# Patient Record
Sex: FEMALE | Race: WHITE | NOT HISPANIC OR LATINO | URBAN - METROPOLITAN AREA
[De-identification: names, ages, dates, MRNs, and addresses within clinical notes are randomized per-mention and may not be internally consistent; named-entity substitution may affect disease eponyms.]

---

## 2017-01-12 ENCOUNTER — ALLSCRIPTS OFFICE VISIT (OUTPATIENT)
Dept: OTHER | Facility: OTHER | Age: 27
End: 2017-01-12

## 2017-01-17 ENCOUNTER — LAB CONVERSION - ENCOUNTER (OUTPATIENT)
Dept: OTHER | Facility: OTHER | Age: 27
End: 2017-01-17

## 2017-01-17 LAB
ADDITIONAL INFORMATION (HISTORICAL): NORMAL
ADEQUACY: (HISTORICAL): NORMAL
COMMENT (HISTORICAL): NORMAL
CYTOTECHNOLOGIST: (HISTORICAL): NORMAL
INTERPRETATION (HISTORICAL): NORMAL
LMP (HISTORICAL): NORMAL
PREV. BX: (HISTORICAL): NORMAL
PREV. PAP (HISTORICAL): NORMAL
REVIEWED BY (HISTORICAL): NORMAL
SOURCE (HISTORICAL): NORMAL

## 2018-01-09 NOTE — RESULT NOTES
Verified Results  (1) URINALYSIS w URINE C/S REFLEX (will reflex a microscopy if leukocytes, occult blood, or nitrites are not within normal limits) [id number] 08:38PM Mariangel Ceron     Test Name Result Flag Reference   COLOR Yellow     CLARITY Cloudy     PH UA 5 5  4 5-8 0   LEUKOCYTE ESTERASE UA Large A Negative   NITRITE UA Negative  Negative   PROTEIN UA Trace mg/dl A Negative   GLUCOSE UA Negative mg/dl  Negative   KETONES UA Negative mg/dl  Negative   UROBILINOGEN UA 0 2 E U /dl  0 2, 1 0 E U /dl   BILIRUBIN UA Negative  Negative   BLOOD UA Moderate A Negative   SPECIFIC GRAVITY UA 1 016  1 003-1 030     (1) URINALYSIS w URINE C/S REFLEX (will reflex a microscopy if leukocytes, occult blood, or nitrites are not within normal limits) [id number] 08:38PM Mariangel Ceron     Test Name Result Flag Reference   BACTERIA Innumerable /hpf A None Seen, Occasional   EPITHELIAL CELLS None Seen /hpf  None Seen, Occasional   RBC UA 4-10 /hpf A None Seen   WBC UA Innumerable /hpf A None Seen

## 2018-01-10 NOTE — RESULT NOTES
Verified Results  (1) URINE CULTURE [id number] 94:31[facility]     Test Name Result Flag Reference   CLINICAL REPORT (Report)     Test:        Urine culture  Specimen Type:   Urine  Specimen Date:   4/14/2016 9:34 PM  Result Date:    4/17/2016 8:11 AM  Result Status:   Final result  Resulting Lab:   [facility] [address] [facility] [address]            Tel: [phone]                 CULTURE                                       ------------------                                   >100,000 cfu/ml Escherichia coli      SUSCEPTIBILITY                                   ------------------                                                       Escherichia coli  METHOD                 VANDANA  -------------------------------------  -------------------------  AMPICILLIN ($$)             <=8 00 ug/ml Susceptible  AZTREONAM ($$$)             <=8 ug/ml   Susceptible  CEFAZOLIN ($)              <=8 00 ug/ml Susceptible  CIPROFLOXACIN ($)            <=1 00 ug/ml Susceptible  GENTAMICIN ($$)             <=4 ug/ml   Susceptible  LEVOFLOXACIN ($)            <=2 00 ug/ml Susceptible  NITROFURANTOIN             <=32 ug/ml  Susceptible  PIPERACILLIN + TAZOBACTAM ($$$)     <=16 ug/ml  Susceptible  TETRACYCLINE              <=4 ug/ml   Susceptible  TOBRAMYCIN ($)             <=4 ug/ml   Susceptible  TRIMETHOPRIM + SULFAMETHOXAZOLE ($$$)  <=2/38 ug/ml Susceptible       Discussion/Summary   urine cult + however, Levofloxacin should take care of the type of bacteria in urine

## 2018-01-11 NOTE — RESULT NOTES
Message   can you please let pt know that her urine culture grew bacteria and it is sensitive to the Abx I started her on  PLease advise to complete entire course   Thank you     Verified Results  (1) URINALYSIS w URINE C/S REFLEX (will reflex a microscopy if leukocytes, occult blood, or nitrites are not within normal limits) [id number] 08:38PM Mariangel Ceron     Test Name Result Flag Reference   CLINICAL REPORT (Report)     Test:        Urine culture  Specimen Type:   Urine  Specimen Date:   6/27/2016 8:38 PM  Result Date:    6/29/2016 10:10 PM  Result Status:   Final result  Resulting Lab:   [facility] [address] [facility] [address]            Tel: [phone]      CULTURE                                       ------------------                                   >100,000 cfu/ml Escherichia coli      SUSCEPTIBILITY                                   ------------------                                                       Escherichia coli  METHOD                 VANDANA  -------------------------------------  -------------------------  AMPICILLIN ($$)             <=8 00 ug/ml Susceptible  AZTREONAM ($$$)             <=8 ug/ml   Susceptible  CEFAZOLIN ($)              <=8 00 ug/ml Susceptible  CIPROFLOXACIN ($)            <=1 00 ug/ml Susceptible  GENTAMICIN ($$)             <=4 ug/ml   Susceptible  LEVOFLOXACIN ($)            <=2 00 ug/ml Susceptible  NITROFURANTOIN             <=32 ug/ml  Susceptible  PIPERACILLIN + TAZOBACTAM ($$$)     <=16 ug/ml  Susceptible  TETRACYCLINE              <=4 ug/ml   Susceptible  TOBRAMYCIN ($)             <=4 ug/ml   Susceptible  TRIMETHOPRIM + SULFAMETHOXAZOLE ($$$)  <=2/38 ug/ml Susceptible       Signatures   Electronically signed by : Corina Baltazar MD; Jun 30 2016  9:42AM EST                       (Author)

## 2018-01-13 NOTE — PROGRESS NOTES
Chief Complaint  HPV injection    Patient received injection into Right Deltoid and handled injection well with no complications    NDC: [id number]  Lot: [id number]  Exp: 7/23/2017      Active Problems    1  Contraception (V25 9) (Z30 9)   2  Encounter for routine gynecological examination (V72 31) (Z01 419)   3  Need for HPV vaccination (V04 89) (Z23)   4  Pap smear, as part of routine gynecological examination (V76 2) (Z01 419)   5  Possible pregnancy (V72 40) (Z32 00)   6  Screen for STD (sexually transmitted disease) (V74 5) (Z11 3)   7  UTI symptoms (788 99) (R39 9)    Current Meds   1  Daily Multivitamin TABS; Therapy: (Recorded:92Cqj9934) to Recorded   2  Levofloxacin 500 MG Oral Tablet; take 1 tablet by mouth daily; Therapy: 14Fdn5496 to (Last Rx:16Krw7532)  Requested for: 71Jph4246 Ordered   3  Microgestin FE 1/20 1-20 MG-MCG Oral Tablet; Take 1 tablet daily; Therapy: 14IZG5161 to (Stefanie Malloy)  Requested for: 94FGL0078; Last   Rx:[id number] Ordered    Allergies    1  Ceclor CAPS    Vitals  Signs [Data Includes: Current Encounter]    Systolic: 434  Diastolic: 80  Height: 5 ft 5 in  Weight: 140 lb 8 0 oz  BMI Calculated: 23 38  BSA Calculated: 1 71  LMP: 18-Apr-2016    Future Appointments    Date/Time Provider Specialty Site   05/12/2016 11:30 AM KATELYNN Easley  Obstetrics/Gynecology [address] [facility]     Signatures   Electronically signed by : Kaylin Carlson, ; Apr 28 2016 11:55AM EST                       (Author)    Electronically signed by : Lissy Cordova, [facility];  Apr 28 2016 11:55AM EST                       (Author)    Electronically signed by : KATELYNN Couch ; Apr 28 2016  2:08PM EST                       (Author)

## 2018-01-14 VITALS
DIASTOLIC BLOOD PRESSURE: 64 MMHG | SYSTOLIC BLOOD PRESSURE: 102 MMHG | HEIGHT: 65 IN | WEIGHT: 145.25 LBS | BODY MASS INDEX: 24.2 KG/M2

## 2018-02-09 ENCOUNTER — OFFICE VISIT (OUTPATIENT)
Dept: OBGYN CLINIC | Facility: CLINIC | Age: 28
End: 2018-02-09
Payer: COMMERCIAL

## 2018-02-09 VITALS
WEIGHT: 134 LBS | SYSTOLIC BLOOD PRESSURE: 112 MMHG | DIASTOLIC BLOOD PRESSURE: 70 MMHG | HEIGHT: 65 IN | BODY MASS INDEX: 22.33 KG/M2

## 2018-02-09 DIAGNOSIS — Z01.419 PAP SMEAR, AS PART OF ROUTINE GYNECOLOGICAL EXAMINATION: ICD-10-CM

## 2018-02-09 DIAGNOSIS — Z01.419 ENCOUNTER FOR GYNECOLOGICAL EXAMINATION WITHOUT ABNORMAL FINDING: Primary | ICD-10-CM

## 2018-02-09 PROCEDURE — 99395 PREV VISIT EST AGE 18-39: CPT | Performed by: PHYSICIAN ASSISTANT

## 2018-02-09 RX ORDER — NORGESTIMATE AND ETHINYL ESTRADIOL 7DAYSX3 LO
1 KIT ORAL DAILY
COMMUNITY
Start: 2017-03-28 | End: 2018-02-23 | Stop reason: SDUPTHER

## 2018-02-09 NOTE — PATIENT INSTRUCTIONS
Speak with insurance regarding coverage for other forms of contraception  Call with mail order pharmacy info

## 2018-02-14 LAB
CLINICAL INFO: NORMAL
CYTO CVX: NORMAL
DATE PREVIOUS BX: NORMAL
LMP START DATE: NORMAL
QUESTION/PROBLEM: NORMAL
SL AMB CONTAINER TYPE: NORMAL
SL AMB FINAL RESOLUTION: NORMAL
SL AMB PREV. PAP:: NORMAL
SL AMB REPORT STATUS: NORMAL
SPECIMEN SOURCE CVX/VAG CYTO: NORMAL

## 2018-02-23 DIAGNOSIS — Z30.9 ENCOUNTER FOR CONTRACEPTIVE MANAGEMENT, UNSPECIFIED TYPE: Primary | ICD-10-CM

## 2018-02-23 RX ORDER — NORGESTIMATE AND ETHINYL ESTRADIOL 7DAYSX3 LO
1 KIT ORAL DAILY
Qty: 84 TABLET | Refills: 3 | Status: SHIPPED | OUTPATIENT
Start: 2018-02-23 | End: 2018-03-06 | Stop reason: SDUPTHER

## 2018-03-05 ENCOUNTER — TELEPHONE (OUTPATIENT)
Dept: OBGYN CLINIC | Facility: CLINIC | Age: 28
End: 2018-03-05

## 2018-03-06 DIAGNOSIS — Z30.9 ENCOUNTER FOR CONTRACEPTIVE MANAGEMENT, UNSPECIFIED TYPE: ICD-10-CM

## 2018-03-06 RX ORDER — NORGESTIMATE AND ETHINYL ESTRADIOL 7DAYSX3 LO
1 KIT ORAL DAILY
Qty: 84 TABLET | Refills: 3 | Status: SHIPPED | OUTPATIENT
Start: 2018-03-06 | End: 2019-03-08 | Stop reason: SDUPTHER

## 2018-06-08 ENCOUNTER — OFFICE VISIT (OUTPATIENT)
Dept: FAMILY MEDICINE CLINIC | Facility: CLINIC | Age: 28
End: 2018-06-08
Payer: COMMERCIAL

## 2018-06-08 ENCOUNTER — TELEPHONE (OUTPATIENT)
Dept: FAMILY MEDICINE CLINIC | Facility: CLINIC | Age: 28
End: 2018-06-08

## 2018-06-08 VITALS
TEMPERATURE: 96.1 F | HEIGHT: 65 IN | HEART RATE: 60 BPM | WEIGHT: 137.2 LBS | DIASTOLIC BLOOD PRESSURE: 62 MMHG | SYSTOLIC BLOOD PRESSURE: 104 MMHG | BODY MASS INDEX: 22.86 KG/M2 | RESPIRATION RATE: 16 BRPM

## 2018-06-08 DIAGNOSIS — E80.6 HYPERBILIRUBINEMIA: Primary | ICD-10-CM

## 2018-06-08 PROCEDURE — 3008F BODY MASS INDEX DOCD: CPT | Performed by: FAMILY MEDICINE

## 2018-06-08 PROCEDURE — 99213 OFFICE O/P EST LOW 20 MIN: CPT | Performed by: FAMILY MEDICINE

## 2018-06-08 NOTE — PROGRESS NOTES
FAMILY PRACTICE OFFICE VISIT       NAME: Mando Conde  AGE: 33 y o  SEX: female       : 1990        MRN: [id number]    DATE: 2018  TIME: 12:05 PM    Assessment and Plan     Problem List Items Addressed This Visit     Hyperbilirubinemia - Primary     Hyperbilirubinemia  Patient given a prescription to obtain a hepatic ultrasound for further evaluation  I suspect patient prior has Gilbert's disease  We will make further recommendations pending results of test         Relevant Orders    US liver            There are no Patient Instructions on file for this visit  Chief Complaint     Chief Complaint   Patient presents with    Follow-up     Pt is here fo foll ow up for bloodwork        History of Present Illness     Patient has a history of onychomycosis and was seeing a podiatrist who was thinking of placing the patient on Lamisil tablets  Patient had screening LFTs which showed hyperbilirubinemia  Patient does not recall any prior history of hyperbilirubinemia  She does not use any over-the-counter medications on a regular basis  She denies any abdominal symptoms or skin changes  She remembers being on Lamisil in the past several years ago with no incidents of any hepatic side effects        Review of Systems   Review of Systems   Constitutional: Negative  Respiratory: Negative  Cardiovascular: Negative  Gastrointestinal: Negative          Active Problem List     Patient Active Problem List   Diagnosis    Hyperbilirubinemia       Past Medical History:  Past Medical History:   Diagnosis Date    Human papilloma virus        Past Surgical History:  Past Surgical History:   Procedure Laterality Date    COLPOSCOPY W/ BIOPSY / CURETTAGE      MYRINGOTOMY      TOOTH EXTRACTION         Family History:  Family History   Problem Relation Age of Onset    No Known Problems Mother     Diabetes Paternal Grandfather        Social History:  Social History     Social History    Marital status: Single     Spouse name: N/A    Number of children: N/A    Years of education: N/A     Occupational History    Not on file  Social History Main Topics    Smoking status: Light Tobacco Smoker    Smokeless tobacco: Never Used      Comment: Occasional tobacco smoker    Alcohol use Yes    Drug use: No    Sexual activity: Yes     Other Topics Concern    Not on file     Social History Narrative    No narrative on file       Objective     Vitals:    06/08/18 1125   BP: 104/62   Pulse: 60   Resp: 16   Temp: (!) 96 1 °F (35 6 °C)     Wt Readings from Last 3 Encounters:   06/08/18 62 2 kg (137 lb 3 2 oz)   02/09/18 60 8 kg (134 lb)   01/12/17 65 9 kg (145 lb 4 oz)       Physical Exam   Constitutional: No distress  Cardiovascular:   Regular rate and rhythm with no murmurs   Pulmonary/Chest:   Lungs are clear to auscultation without wheezes,rales, or rhonchi   Abdominal:   Abdomen is soft, nontender with positive bowel sounds  There is no rebound or guarding  No masses palpated  Negative CVA tenderness   Musculoskeletal: She exhibits no edema  Skin: No rash noted  Pertinent Laboratory/Diagnostic Studies:  No results found for: GLUCOSE, BUN, CREATININE, CALCIUM, NA, K, CO2, CL  No results found for: ALT, AST, GGT, ALKPHOS, BILITOT    No results found for: WBC, HGB, HCT, MCV, PLT    No results found for: TSH    No results found for: CHOL  No results found for: TRIG  No results found for: HDL  No results found for: LDLCALC  No results found for: HGBA1C    Results for orders placed or performed in visit on 02/09/18   Test in Question   Result Value Ref Range    Report Status FINAL     Container Type THIN PREP     QUESTION/PROBLEM NON QUEST REQ     Final Resolution PROCESS [id number]    Thinprep Pap Reflex HPV mRNA E6/E7   Result Value Ref Range    SL AMB CLINICAL INFORMATION NONE GIVEN     SL AMB LMP: 13,118     SL AMB PREV  PAP: NONE GIVEN     SL AMB PREV   BX: NONE GIVEN     Source CERVICAL     SL AMB STATEMENT OF ADEQUACY:      [place] INTERPRETATION/RESULT: Negative for intraepithelial lesion or malignancy  [place] CYTOTECHNOLOGIST:      [place] REVIEW CYTOTECHNOLOGIST:         Orders Placed This Encounter   Procedures    US liver       ALLERGIES:  Allergies   Allergen Reactions    Cefaclor        Current Medications     Current Outpatient Prescriptions   Medication Sig Dispense Refill    norgestimate-ethinyl estradiol (TRI-LO-SPRINTEC) 0 18/0 215/0 25 MG-25 MCG per tablet Take 1 tablet by mouth daily 84 tablet 3     No current facility-administered medications for this visit            Health Maintenance     Health Maintenance   Topic Date Due    HIV SCREENING  1990    Depression Screening PHQ-9  1990    PNEUMOCOCCAL POLYSACCHARIDE VACCINE AGE 2-64 HIGH RISK  08/23/1993    DTaP,Tdap,and Td Vaccines (1 - Tdap) 08/23/2012    INFLUENZA VACCINE  09/01/2018     Immunization History   Administered Date(s) Administered    HPV Quadrivalent 10/15/2015, 04/28/2016    HPV9 94/45/0944       Maria Fernanda Thorne MD

## 2018-06-08 NOTE — TELEPHONE ENCOUNTER
Cornelia Mata is calling to get the CPT code for the Ultrasound that Colten Leisa is to have done   Please call the patient with this information  (per: Cornelia Mata)

## 2018-06-08 NOTE — ASSESSMENT & PLAN NOTE
Hyperbilirubinemia  Patient given a prescription to obtain a hepatic ultrasound for further evaluation  I suspect patient prior has Gilbert's disease   We will make further recommendations pending results of test

## 2019-03-08 DIAGNOSIS — Z30.9 ENCOUNTER FOR CONTRACEPTIVE MANAGEMENT, UNSPECIFIED TYPE: ICD-10-CM

## 2019-03-08 RX ORDER — NORGESTIMATE AND ETHINYL ESTRADIOL 7DAYSX3 LO
1 KIT ORAL DAILY
Qty: 84 TABLET | Refills: 0 | Status: SHIPPED | OUTPATIENT
Start: 2019-03-08 | End: 2019-03-28 | Stop reason: SDUPTHER

## 2019-03-28 ENCOUNTER — ANNUAL EXAM (OUTPATIENT)
Dept: OBGYN CLINIC | Facility: CLINIC | Age: 29
End: 2019-03-28
Payer: COMMERCIAL

## 2019-03-28 VITALS
WEIGHT: 139.4 LBS | DIASTOLIC BLOOD PRESSURE: 76 MMHG | HEIGHT: 65 IN | BODY MASS INDEX: 23.22 KG/M2 | SYSTOLIC BLOOD PRESSURE: 110 MMHG

## 2019-03-28 DIAGNOSIS — Z30.9 ENCOUNTER FOR CONTRACEPTIVE MANAGEMENT, UNSPECIFIED TYPE: ICD-10-CM

## 2019-03-28 DIAGNOSIS — Z01.419 PAP SMEAR, AS PART OF ROUTINE GYNECOLOGICAL EXAMINATION: ICD-10-CM

## 2019-03-28 DIAGNOSIS — Z01.419 ENCOUNTER FOR GYNECOLOGICAL EXAMINATION WITHOUT ABNORMAL FINDING: Primary | ICD-10-CM

## 2019-03-28 PROCEDURE — 99395 PREV VISIT EST AGE 18-39: CPT | Performed by: PHYSICIAN ASSISTANT

## 2019-03-28 RX ORDER — NORGESTIMATE AND ETHINYL ESTRADIOL 7DAYSX3 LO
1 KIT ORAL DAILY
Qty: 84 TABLET | Refills: 3 | Status: SHIPPED | OUTPATIENT
Start: 2019-03-28 | End: 2019-06-26 | Stop reason: SDUPTHER

## 2019-03-28 NOTE — PROGRESS NOTES
Assessment/Plan:    No problem-specific Assessment & Plan notes found for this encounter  Diagnoses and all orders for this visit:    Encounter for gynecological examination without abnormal finding    Pap smear, as part of routine gynecological examination  -     Thinprep Pap (Refl) HPV mRNA E6/E7    Encounter for contraceptive management, unspecified type  -     norgestimate-ethinyl estradiol (TRI-LO-SPRINTEC) 0 18/0 215/0 25 MG-25 MCG per tablet; Take 1 tablet by mouth daily        Pap done  Refills of OCP sent to mail order pharmacy  If no problems, patient to return in 1 year for routine gyn care  Subjective:      Patient ID: Mariola Xiao is a 33 y o  female  Patient is here for annual exam   States she is doing well  No complaints on her OCP  Periods are regular every 28 days, and bleeding lasts for 5 days  She denies any heavy bleeding, bad cramping, headache, and mood symptoms  Requests refills today  Patient also denies any change in bowel/bladder habits, pelvic pain, bloating, abdominal pain, n/v, change in appetite, and thyroid disease  Patient is performing self-breast exam   Denies new masses, skin changes, nipple discharge, and pain/tenderness  The following portions of the patient's history were reviewed and updated as appropriate: allergies, current medications, past family history, past medical history, past social history, past surgical history and problem list     Review of Systems   Constitutional: Negative for appetite change and unexpected weight change  Cardiovascular:        No masses, skin changes, nipple discharge, and pain/tenderness  Gastrointestinal: Negative for abdominal distention, abdominal pain, constipation, diarrhea, nausea and vomiting  Genitourinary: Negative for difficulty urinating, dysuria, frequency, genital sores, hematuria, menstrual problem, pelvic pain, urgency, vaginal bleeding, vaginal discharge and vaginal pain  Objective:      /76   Ht 5' 5" (1 651 m)   Wt 63 2 kg (139 lb 6 4 oz)   LMP 02/19/2019 (Approximate)   BMI 23 20 kg/m²          Physical Exam   Constitutional: She is oriented to person, place, and time  Vital signs are normal  She appears well-developed and well-nourished  Neck: No thyromegaly present  Cardiovascular: Normal rate, regular rhythm and normal heart sounds  Exam reveals no gallop and no friction rub  No murmur heard  Pulmonary/Chest: Effort normal and breath sounds normal  Right breast exhibits no inverted nipple, no mass, no nipple discharge, no skin change and no tenderness  Left breast exhibits no inverted nipple, no mass, no nipple discharge, no skin change and no tenderness  No breast swelling, tenderness, discharge or bleeding  Breasts are symmetrical    Abdominal: Soft  Normal appearance and bowel sounds are normal  She exhibits no distension  There is no tenderness  Genitourinary: Vagina normal and uterus normal  No breast tenderness, discharge or bleeding  No labial fusion  There is no rash, tenderness, lesion or injury on the right labia  There is no rash, tenderness, lesion or injury on the left labia  Cervix exhibits no motion tenderness, no discharge and no friability  Right adnexum displays no mass, no tenderness and no fullness  Left adnexum displays no mass, no tenderness and no fullness  No erythema, tenderness or bleeding in the vagina  No vaginal discharge found  Lymphadenopathy:     She has no cervical adenopathy  No inguinal adenopathy noted on the right or left side  Right: No inguinal adenopathy present  Left: No inguinal adenopathy present  Neurological: She is alert and oriented to person, place, and time  Skin: Skin is warm and dry  Psychiatric: She has a normal mood and affect  Her behavior is normal  Judgment and thought content normal    Vitals reviewed

## 2019-04-01 LAB
CLINICAL INFO: NORMAL
CYTO CVX: NORMAL
DATE PREVIOUS BX: NORMAL
LMP START DATE: NORMAL
SL AMB PREV. PAP:: NORMAL
SPECIMEN SOURCE CVX/VAG CYTO: NORMAL

## 2019-06-26 DIAGNOSIS — Z30.9 ENCOUNTER FOR CONTRACEPTIVE MANAGEMENT, UNSPECIFIED TYPE: ICD-10-CM

## 2019-06-26 RX ORDER — NORGESTIMATE AND ETHINYL ESTRADIOL 7DAYSX3 LO
1 KIT ORAL DAILY
Qty: 28 TABLET | Refills: 9 | Status: SHIPPED | OUTPATIENT
Start: 2019-06-26 | End: 2019-08-01 | Stop reason: SDUPTHER

## 2019-08-01 DIAGNOSIS — Z30.9 ENCOUNTER FOR CONTRACEPTIVE MANAGEMENT, UNSPECIFIED TYPE: ICD-10-CM

## 2019-08-01 RX ORDER — NORGESTIMATE AND ETHINYL ESTRADIOL 7DAYSX3 LO
1 KIT ORAL DAILY
Qty: 84 TABLET | Refills: 1 | Status: SHIPPED | OUTPATIENT
Start: 2019-08-01 | End: 2020-03-13 | Stop reason: SDUPTHER

## 2020-03-13 DIAGNOSIS — Z30.9 ENCOUNTER FOR CONTRACEPTIVE MANAGEMENT, UNSPECIFIED TYPE: ICD-10-CM

## 2020-03-13 RX ORDER — NORGESTIMATE AND ETHINYL ESTRADIOL 7DAYSX3 LO
1 KIT ORAL DAILY
Qty: 84 TABLET | Refills: 0 | Status: SHIPPED | OUTPATIENT
Start: 2020-03-13 | End: 2020-06-18 | Stop reason: SDUPTHER

## 2020-06-18 DIAGNOSIS — Z30.9 ENCOUNTER FOR CONTRACEPTIVE MANAGEMENT, UNSPECIFIED TYPE: ICD-10-CM

## 2020-06-18 RX ORDER — NORGESTIMATE AND ETHINYL ESTRADIOL 7DAYSX3 LO
1 KIT ORAL DAILY
Qty: 84 TABLET | Refills: 0 | Status: SHIPPED | OUTPATIENT
Start: 2020-06-18 | End: 2020-07-14 | Stop reason: SDUPTHER

## 2020-07-14 ENCOUNTER — ANNUAL EXAM (OUTPATIENT)
Dept: OBGYN CLINIC | Facility: CLINIC | Age: 30
End: 2020-07-14
Payer: COMMERCIAL

## 2020-07-14 VITALS
BODY MASS INDEX: 22.16 KG/M2 | TEMPERATURE: 98.3 F | HEIGHT: 65 IN | WEIGHT: 133 LBS | SYSTOLIC BLOOD PRESSURE: 106 MMHG | DIASTOLIC BLOOD PRESSURE: 74 MMHG

## 2020-07-14 DIAGNOSIS — Z30.9 ENCOUNTER FOR CONTRACEPTIVE MANAGEMENT, UNSPECIFIED TYPE: ICD-10-CM

## 2020-07-14 DIAGNOSIS — Z01.419 ENCOUNTER FOR GYNECOLOGICAL EXAMINATION WITHOUT ABNORMAL FINDING: Primary | ICD-10-CM

## 2020-07-14 PROCEDURE — 99395 PREV VISIT EST AGE 18-39: CPT | Performed by: PHYSICIAN ASSISTANT

## 2020-07-14 RX ORDER — NORGESTIMATE AND ETHINYL ESTRADIOL 7DAYSX3 LO
1 KIT ORAL DAILY
Qty: 84 TABLET | Refills: 3 | Status: SHIPPED | OUTPATIENT
Start: 2020-07-14 | End: 2020-09-08 | Stop reason: SDUPTHER

## 2020-07-14 NOTE — PROGRESS NOTES
Assessment/Plan:    No problem-specific Assessment & Plan notes found for this encounter  Diagnoses and all orders for this visit:    Encounter for gynecological examination without abnormal finding  -     Pap Lb, rfx HPV ASCU    Encounter for contraceptive management, unspecified type  -     norgestimate-ethinyl estradiol (Tri-Lo-Sprintec) 0 18/0 215/0 25 MG-25 MCG per tablet; Take 1 tablet by mouth daily    Other orders  -     Cancel: Thinprep Pap (Refl) HPV mRNA E6/E7        Pap done  Refills of OCP sent to pharmacy  If no problems, patient to return in 1 year for routine gyn care  Subjective:      Patient ID: Abdias Thomas is a 30 y o  female  Patient is here for annual exam   States she is doing well  No complaints on her OCP  Periods are regular every 28 days, and bleeding lasts for 5 days  She denies any heavy bleeding, severe cramping, headache, and mood symptoms  Requests refills today  Patient also denies any change in bowel/bladder habits, pelvic pain, bloating, abdominal pain, n/v, change in appetite, and thyroid disease  Patient is performing self-breast exam   Denies new masses, skin changes, nipple discharge, and pain/tenderness  The following portions of the patient's history were reviewed and updated as appropriate: allergies, current medications, past family history, past medical history, past social history, past surgical history and problem list     Review of Systems   Constitutional: Negative for appetite change and unexpected weight change  Cardiovascular:        No masses, skin changes, nipple discharge, and pain/tenderness  Gastrointestinal: Negative for abdominal distention, abdominal pain, constipation, diarrhea, nausea and vomiting  Genitourinary: Negative for difficulty urinating, dysuria, frequency, genital sores, hematuria, menstrual problem, pelvic pain, urgency, vaginal bleeding, vaginal discharge and vaginal pain  Neurological: Negative for headaches  Objective:      /74   Temp 98 3 °F (36 8 °C)   Ht 5' 5" (1 651 m)   Wt 60 3 kg (133 lb)   LMP 06/27/2020   BMI 22 13 kg/m²          Physical Exam   Constitutional: She is oriented to person, place, and time  Vital signs are normal  She appears well-developed and well-nourished  Neck: No thyromegaly present  Pulmonary/Chest: Effort normal  Right breast exhibits no inverted nipple, no mass, no nipple discharge, no skin change and no tenderness  Left breast exhibits no inverted nipple, no mass, no nipple discharge, no skin change and no tenderness  No breast swelling, tenderness, discharge or bleeding  Breasts are symmetrical    Abdominal: Soft  Normal appearance  She exhibits no distension  There is no tenderness  Genitourinary: Vagina normal and uterus normal  No breast tenderness, discharge or bleeding  No labial fusion  There is no rash, tenderness, lesion or injury on the right labia  There is no rash, tenderness, lesion or injury on the left labia  Cervix exhibits no motion tenderness, no discharge and no friability  Right adnexum displays no mass, no tenderness and no fullness  Left adnexum displays no mass, no tenderness and no fullness  No erythema, tenderness or bleeding in the vagina  No vaginal discharge found  Lymphadenopathy:     She has no cervical adenopathy  No inguinal adenopathy noted on the right or left side  Right: No inguinal adenopathy present  Left: No inguinal adenopathy present  Neurological: She is alert and oriented to person, place, and time  Skin: Skin is warm and dry  Psychiatric: She has a normal mood and affect  Her behavior is normal  Judgment and thought content normal    Vitals reviewed

## 2020-07-16 LAB
CYTOLOGIST CVX/VAG CYTO: NORMAL
DX ICD CODE: NORMAL
OTHER STN SPEC: NORMAL
OTHER STN SPEC: NORMAL
PATH REPORT.FINAL DX SPEC: NORMAL
SL AMB NOTE:: NORMAL
SL AMB SPECIMEN ADEQUACY: NORMAL

## 2020-09-08 DIAGNOSIS — Z30.9 ENCOUNTER FOR CONTRACEPTIVE MANAGEMENT, UNSPECIFIED TYPE: ICD-10-CM

## 2020-09-08 RX ORDER — NORGESTIMATE AND ETHINYL ESTRADIOL 7DAYSX3 LO
1 KIT ORAL DAILY
Qty: 84 TABLET | Refills: 3 | OUTPATIENT
Start: 2020-09-08 | End: 2021-09-08

## 2020-09-08 RX ORDER — NORGESTIMATE AND ETHINYL ESTRADIOL 7DAYSX3 LO
1 KIT ORAL DAILY
Qty: 84 TABLET | Refills: 3 | Status: SHIPPED | OUTPATIENT
Start: 2020-09-08 | End: 2021-09-08

## 2023-08-17 ENCOUNTER — EVALUATION (OUTPATIENT)
Dept: PHYSICAL THERAPY | Facility: CLINIC | Age: 33
End: 2023-08-17
Payer: COMMERCIAL

## 2023-08-17 DIAGNOSIS — N39.3 STRESS INCONTINENCE OF URINE: Primary | ICD-10-CM

## 2023-08-17 DIAGNOSIS — M62.08 DIASTASIS RECTI: ICD-10-CM

## 2023-08-17 DIAGNOSIS — N94.10 DYSPAREUNIA IN FEMALE: ICD-10-CM

## 2023-08-17 PROCEDURE — 97162 PT EVAL MOD COMPLEX 30 MIN: CPT

## 2023-08-17 NOTE — PROGRESS NOTES
PT Evaluation     Today's date: 2023  Patient name: Neil Sutton  : 1990  MRN: [id number]  Referring provider: Self, Referral  Dx:   Encounter Diagnosis     ICD-10-CM    1. Stress incontinence of urine  N39.3           Start Time: 0900  Stop Time: 1000  Total time in clinic (min): 60 minutes    Assessment  Assessment details:   CASE SUMMARY:   Neil Sutton is a 33y.o. year old female who reports onset of symptoms ~  Mixed urinary incontinence, dyspareunia, and diastasis recti. Patient describes symptoms as: annoying and painful. Symptoms are : intermittent. Philbert Hazard is limited in the following activities: sexual intercourse without pain. PMHx includes: See chart for full details with medications. Patient's clinical presentation is consistent with their referring diagnosis of: Stress incontinence of urine  (primary encounter diagnosis)    Diastasis recti    Dyspareunia in female  . POC was discussed and agreed upon with patient. Patient was educated on: pelvic floor anatomy, Importance of body mechanics and ergonomics in regards to protecting against activities which increase IAP and pressure,  and PT exam and course of treatment. Patient vocalized a good understanding of  POC and HEP issued.  Patient would benefit from skilled physical therapy services to address their aforementioned functional limitations and progress towards prior level of function and independence with home exercise program.      Pelvic floor verbal consent and written consent signed and in chart-LSR 23  Patient deferred second person in room: YES         Impairments: abnormal muscle firing, abnormal muscle tone, activity intolerance, impaired physical strength, lacks appropriate home exercise program, poor posture  and poor body mechanics  Understanding of Dx/Px/POC: good   Prognosis: good    Goals  STG (3 weeks)  Patient will be independent with HEP  Patient will demonstrate ability to properly fill out bowel/ bladder log  Patient will self report sxs decrease by 25%  Patient will demonstrate the ability to perform kegel and downtraining    LTG (8 weeks)  Patient will be independent in comprehensive HEP  Patient will improve score PFDI by 3 points  Patient will self report sxs decrease by 75%  Patient will demonstrate the ability to perform sustained kegels in functional positioning    Plan  Patient would benefit from: skilled physical therapy  Planned modality interventions: biofeedback, cryotherapy, TENS, ultrasound and hydrotherapy  Planned therapy interventions: joint mobilization, manual therapy, neuromuscular re-education, patient education, postural training, strengthening, stretching, therapeutic activities, therapeutic exercise, functional ROM exercises, flexibility, graded activity, home exercise program, abdominal trunk stabilization, Malave taping, massage and breathing training  Frequency: 1x week  Duration in weeks: 10  Plan of Care beginning date: 2023  Plan of Care expiration date: 10/26/2023  Treatment plan discussed with: patient        PT Pelvic Floor Subjective:   History of Present Illness:   Patient reports that she is 7 weeks postpartum and has some urinary incontinence. Would like to reduce that and get pelvic floor into better shape. Is interested in having another child down the road.  Date of onset: 2023          Not a recurrent problem   Quality of life: good    Social Support:     Lives in:  Multiple-level home    Lives with:  Spouse and young children    Relationship status: /committed    Work status: employed full time    Life stress level: 2    History of Depression: noPronouns: she/her  Hand dominance:  Right  Diet and Exercise:      Walks- currently exhausted with   OB/ gyn History    Gestational History:     Prior Pregnancy: Yes      Number of prior pregnancies: 1    Number of term pregnancies: 1    Delivery Type: vaginal delivery    no delivery complications    Menstrual History:    Date of last menstrual period: 2022    Menstrual irregularities regular menses    Painful periods:  No difficulty managing menstrual pain    Tolerates tampons: yes    Menopausal: no menopause    Birth control method: IUD  Breast feeding   Bladder Function:     Voiding Difficulties positive for: frequent urination      Voiding Difficulties comments:     Urinary leakage: urine leakage    Urinary leakage aggravated by: coughing, sneezing, walking to the bathroom and seeing a toilet    Nocturia (episodes per night): 2    Painful urination: No      Intake (ounces): Water: 32, Coffee: 8,   Incontinence Management:     Pads/Diaper Use:  None  Bowel Function:     Bowel frequency: daily    [place] Stool Scale: type 3 and type 4    Stool softener use: no stool softeners    Enema use: no enema    Uses "squatty potty": no Squatty Pottybrown :  Sexual Function:     Sexually Active:  Sexually active    Pain during intercourse: Yes      Patient wishes to return to having intercourse: currently unable to have intercourse but wants toLabial tear-localized pain :Sexual function: able to achieve orgasm and vaginal painLabial tear-localized pain :  Pain:     Current pain ratin    At best pain ratin    At worst pain ratin    Quality:  Burning, pressure and needle-like      Objective       Abdominal Assessment:    Abdominal Assessment: P: 2/5  E: 5 sec  R: 4     Able to engage anterior pelvic floor b/l    Diastatis   Diastasis recti present: yes  3" above umbilicus (# fingers): 0  Umbilicus (# fingers): 3  3" below umbilicus (# fingers): 1  no tenderness at linea alba    Skin inspection:   Additional skin inspection details: Labial scaring on right      General Perineum Exam:   perineum intact.      Visual Inspection of Perineum:   Excursion of perineal body in cephalad direction with contraction of pelvic floor muscles (PFM): good  Excursion of perineal body in caudal direction with relaxation of pelvic floor muscles (PFM): fair   Involuntary contraction with coughing: no  Involuntary relaxation with bearing down: no  Cotton swab test: non-tender  Sensation: intact  Tenderness: unprovoked    Pelvic Organ Prolapse   no pelvic organ prolapse  Perineal body inspection: within normal limits        Pelvic Floor Muscle Exam:    Breathing pattern with contraction: holding breath   Pelvic floor muscle relaxation is delayed. 2 seconds required for complete relaxation. PERFECT Score   Power right: 2/5   Power left: 2/5      pelvic floor exam consent given by patient    Pelvic exam completed: vaginally     SMEG Biofeedback   to be assessed next treatment                Insurance:  A/CMS Eval/ Re-eval POC expires PFDI Auth #/ Referral # Total    Start date  Expiration date Extension  Visit limitation? PT only or  PT+OT?  Co-Insurance   CMS 8/17/23 10/26 11.25                                                                       AUTH #:  Date               Authed: Used                Remaining                    Precautions: standard- 7 weeks postpartum    Date: 8/17/23         Session: IE         PDFI: 11.25         Manuals                                                  Neuro Re-Ed          Diaphragmatic breathing          360 breathing          Neural reset          TA engagement          TA+ kegel                              Ther Ex          Susan pose          Happy baby          cat-cow          Cobbler's pose          Lord of the half fish                                        Ther Activity                              Gait Training                              Modalities

## 2023-08-22 ENCOUNTER — APPOINTMENT (OUTPATIENT)
Dept: PHYSICAL THERAPY | Facility: CLINIC | Age: 33
End: 2023-08-22
Payer: COMMERCIAL

## 2023-08-29 ENCOUNTER — OFFICE VISIT (OUTPATIENT)
Dept: PHYSICAL THERAPY | Facility: CLINIC | Age: 33
End: 2023-08-29
Payer: COMMERCIAL

## 2023-08-29 DIAGNOSIS — M62.08 DIASTASIS RECTI: ICD-10-CM

## 2023-08-29 DIAGNOSIS — N39.3 STRESS INCONTINENCE OF URINE: Primary | ICD-10-CM

## 2023-08-29 DIAGNOSIS — N94.10 DYSPAREUNIA IN FEMALE: ICD-10-CM

## 2023-08-29 PROCEDURE — 97112 NEUROMUSCULAR REEDUCATION: CPT

## 2023-08-29 NOTE — PROGRESS NOTES
Daily Note     Today's date: 2023  Patient name: Porfirio Gregorio  : 1990  MRN: [id number]  Referring provider: Self, Referral  Dx:   Encounter Diagnosis     ICD-10-CM    1. Stress incontinence of urine  N39.3       2. Diastasis recti  M62.08       3. Dyspareunia in female  N94.10                      Subjective: Patient reports 50% compliance with HEP. Pt reports that she has been able to triple the amount of kegels she can perform at one time. Pt denies any leakage at night and is not specifically waking up to use the bathroom. Patient reports over the last week  Pt has had 2-3 instances on urinary leakage but significantly smaller amount. Objective: See treatment diary below      Assessment: Pt was fatigued with new TE added but able to decrease finger width in DR, continue to progress as pt is able to tolerate. Plan: Continue per plan of care. Insurance:  A/CMS Eval/ Re-eval POC expires PFDI Auth #/ Referral # Total    Start date  Expiration date Extension  Visit limitation? PT only or  PT+OT?  Co-Insurance   CMS 8/17/23 10/26 11.25                                                                       Aidan Cloud #:  Date               Authed: Used                Remaining                    Precautions: standard- 7 weeks postpartum    Date: 23        Session: IE 2        PDFI: 11.25         Manuals                                                  Neuro Re-Ed          Diaphragmatic breathing  x10 +TA        360 breathing  x10        Neural reset          TA engagement  x10        TA+ kegel          TA+hip add iso  5" 2x10        TA + hip abd iso  RTB 5" 2x10        Ther Ex          Susan pose          Happy baby          cat-cow          Cobbler's pose          Lord of the half fish                                        Ther Activity                              Gait Training                              Modalities

## 2023-09-07 ENCOUNTER — OFFICE VISIT (OUTPATIENT)
Dept: PHYSICAL THERAPY | Facility: CLINIC | Age: 33
End: 2023-09-07
Payer: COMMERCIAL

## 2023-09-07 DIAGNOSIS — N39.3 STRESS INCONTINENCE OF URINE: Primary | ICD-10-CM

## 2023-09-07 DIAGNOSIS — N94.10 DYSPAREUNIA IN FEMALE: ICD-10-CM

## 2023-09-07 DIAGNOSIS — M62.08 DIASTASIS RECTI: ICD-10-CM

## 2023-09-07 PROCEDURE — 97112 NEUROMUSCULAR REEDUCATION: CPT

## 2023-09-07 NOTE — PROGRESS NOTES
Daily Note     Today's date: 2023  Patient name: Catalina Vinson  : 1990  MRN: [id number]  Referring provider: Self, Referral  Dx:   Encounter Diagnosis     ICD-10-CM    1. Stress incontinence of urine  N39.3       2. Diastasis recti  M62.08       3. Dyspareunia in female  N94.10           Start Time: 0900  Stop Time: 1000  Total time in clinic (min): 60 minutes    Subjective: Less urgentcy with urination, water running is still a trigger, since last appointment only 2 incidence of leakage and they were small. Urination around 10 sec      Objective: See treatment diary below      Assessment: Tolerated treatment well. Patient would benefit from continued PT in order to increase pressure management skills and increase pelvic floor strength. Did well with the addition of biofeedback. Demonstrates ability to perform and release pelvic floor contractions. Did well with sustained low level contraction and incorporating into functional movements    Plan: Continue per plan of care. Insurance:  A/CMS Eval/ Re-eval POC expires PFDI Auth #/ Referral # Total    Start date  Expiration date Extension  Visit limitation? PT only or  PT+OT?  Co-Insurance   CMS 8/17/23 10/26 11.25                                                                       AUTH #:  Date               Authed: Used                Remaining                    Precautions: standard- 7 weeks postpartum  P: 2/5  E: 5 sec  R: 4   Date: 23       Session: IE 2 3       PDFI: 11.25         Manuals                                                  Neuro Re-Ed          Diaphragmatic breathing  x10 +TA        360 breathing  x10        Neural reset          TA engagement  x10 5x + BFB       TA+ kegel   10x + BFB       TA+hip add iso  5" 2x10 TA + kegel + bridge 20x, SLR b/l 10x + BFB       TA + hip abd iso  RTB 5" 2x10 Pressure management- kegel with lifting 8# weight onto shelf- sustained 2* 8       Ther Ex   8# weight into cabinet 3*8 Susan pose   Lateral walks with blue loop+ kegel       Happy baby   kegel + monster walk forward and backward       cat-cow          Cobbler's pose          Lord of the half fish                                        Ther Activity                              Gait Training                              Modalities

## 2023-09-19 ENCOUNTER — APPOINTMENT (OUTPATIENT)
Dept: PHYSICAL THERAPY | Facility: CLINIC | Age: 33
End: 2023-09-19
Payer: COMMERCIAL

## 2023-09-28 ENCOUNTER — APPOINTMENT (OUTPATIENT)
Dept: PHYSICAL THERAPY | Facility: CLINIC | Age: 33
End: 2023-09-28
Payer: COMMERCIAL

## 2023-12-13 ENCOUNTER — TELEPHONE (OUTPATIENT)
Dept: FAMILY MEDICINE CLINIC | Facility: CLINIC | Age: 33
End: 2023-12-13

## 2023-12-13 NOTE — LETTER
Awilda Sriedvi  [address] [facility] [address]      12/13/2023       Dear Awilda     Records from Insurance indicate that you are a patient of Saúl Ordonez MD with [facility], [facility]. Your last office visit was on 6/8/2018.  Please call the office to ensure you remain established and to schedule your Annual Physical at [phone].    If you are seeing a primary care provider other than the one listed above, you can simply call the number on the back of your insurance card to change your primary care physician with your insurance company.    We thank you for choosing [facility] for your healthcare needs.    Sincerely,    Sunshine Ricks MA  Practice Based   [facility]

## 2023-12-22 NOTE — TELEPHONE ENCOUNTER
12/22/23 12:12 PM        The office's request has been received, reviewed, and the patient chart updated. The PCP has successfully been removed with a patient attribution note. This message will now be completed.        Thank you  Ken Barclay